# Patient Record
Sex: FEMALE | Race: WHITE | NOT HISPANIC OR LATINO | URBAN - METROPOLITAN AREA
[De-identification: names, ages, dates, MRNs, and addresses within clinical notes are randomized per-mention and may not be internally consistent; named-entity substitution may affect disease eponyms.]

---

## 2017-01-23 ENCOUNTER — OFFICE VISIT - RIVER FALLS (OUTPATIENT)
Dept: FAMILY MEDICINE | Facility: CLINIC | Age: 27
End: 2017-01-23

## 2017-01-23 ASSESSMENT — MIFFLIN-ST. JEOR: SCORE: 1335.6

## 2017-05-27 ENCOUNTER — COMMUNICATION - RIVER FALLS (OUTPATIENT)
Dept: FAMILY MEDICINE | Facility: CLINIC | Age: 27
End: 2017-05-27

## 2017-05-27 ENCOUNTER — OFFICE VISIT - RIVER FALLS (OUTPATIENT)
Dept: FAMILY MEDICINE | Facility: CLINIC | Age: 27
End: 2017-05-27

## 2020-01-08 ENCOUNTER — AMBULATORY - RIVER FALLS (OUTPATIENT)
Dept: FAMILY MEDICINE | Facility: CLINIC | Age: 30
End: 2020-01-08

## 2021-06-12 ENCOUNTER — OFFICE VISIT - RIVER FALLS (OUTPATIENT)
Dept: FAMILY MEDICINE | Facility: CLINIC | Age: 31
End: 2021-06-12

## 2021-06-12 ASSESSMENT — MIFFLIN-ST. JEOR: SCORE: 1299.76

## 2021-11-18 ENCOUNTER — OFFICE VISIT - RIVER FALLS (OUTPATIENT)
Dept: FAMILY MEDICINE | Facility: CLINIC | Age: 31
End: 2021-11-18

## 2021-11-18 ASSESSMENT — MIFFLIN-ST. JEOR: SCORE: 1323.8

## 2022-02-11 VITALS
HEART RATE: 80 BPM | BODY MASS INDEX: 20.66 KG/M2 | SYSTOLIC BLOOD PRESSURE: 104 MMHG | TEMPERATURE: 98.2 F | DIASTOLIC BLOOD PRESSURE: 66 MMHG | WEIGHT: 123.2 LBS

## 2022-02-11 VITALS
WEIGHT: 138.6 LBS | HEART RATE: 76 BPM | HEIGHT: 65 IN | DIASTOLIC BLOOD PRESSURE: 58 MMHG | SYSTOLIC BLOOD PRESSURE: 100 MMHG | BODY MASS INDEX: 23.09 KG/M2 | TEMPERATURE: 98.7 F

## 2022-02-11 VITALS
SYSTOLIC BLOOD PRESSURE: 110 MMHG | BODY MASS INDEX: 22.66 KG/M2 | WEIGHT: 136 LBS | DIASTOLIC BLOOD PRESSURE: 64 MMHG | HEIGHT: 65 IN | HEART RATE: 72 BPM | TEMPERATURE: 98.5 F

## 2022-02-11 VITALS
WEIGHT: 130.7 LBS | SYSTOLIC BLOOD PRESSURE: 108 MMHG | BODY MASS INDEX: 21.77 KG/M2 | HEART RATE: 66 BPM | TEMPERATURE: 97.2 F | HEIGHT: 65 IN | DIASTOLIC BLOOD PRESSURE: 68 MMHG

## 2022-02-15 NOTE — PROGRESS NOTES
Patient:   NABOR MIN            MRN: [id number]            FIN: [id number]               Age:   26 years     Sex:  Female     :  1990   Associated Diagnoses:   Sorethroat   Author:   Shanti Domínguez      Visit Information      Date of Service: 2017 09:03 am  Performing Location: [facility]  Encounter#: [id number]      Primary Care Provider (PCP):  Aleksandra Stanford MD    NPI# [id number]      Referring Provider:  No referring provider recorded for selected visit.      Chief Complaint   2017 9:05 AM CDT    PT c/o sore throat since last night.        History of Present Illness   Confirmed symptoms and concerns with patient as presented in CC above.  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    Ear/Nose/Mouth/Throat:  No ear pain, No nasal congestion.    Respiratory:  No shortness of breath, No cough, No wheezing.    Gastrointestinal:  No nausea, No vomiting, No diarrhea.             Health Status   Allergies:    Allergic Reactions (Selected)  No known allergies   Medications:  (Selected)   Prescriptions  Prescribed  Prenatal AD oral tablet: 1 tab(s), po, daily, # 30 tab(s), 0 Refill(s), Type: Maintenance, OTC (Rx)   Problem list:    All Problems  Pregnant / SNOMED CT 289668718 / Confirmed  Mole NOS / ICD-9- / Confirmed  Little mole beneath navel.  Acne / ICD-9-.1 / Confirmed  ASCUS with positive high risk HPV / ICD-9-.15 / Confirmed  Inactive: Chickenpox / ICD-9-.9  Resolved: ASCUS Pap  Resolved: Tonsillitis with exudate / ICD-9-  Resolved: Otitis media with effusion - serous / SNOMED CT 966120906  Left ear.  Canceled: Chicken Pox  Canceled: Otitis media with effusion / SNOMED CT 484365603  Left ear.      Histories   Past Medical History:    Active  Mole NOS (631)  Comments:  3/21/2013 CDT 9:38 AM  CDT - Mandie Gates  Little mole beneath navel.  Resolved  Tonsillitis with exudate (463): Onset in the month of 1/2009 at 18 years  Resolved.  Otitis media with effusion - serous (913365787): Onset in the month of 4/2008 at 17 years  Resolved.  Comments:  3/21/2013 CDT 9:43 AM CDT - Mandie Gates  Left ear.  ASCUS Pap:  Resolved.   Family History:    Hypertension  Grandmother (P)  Hypercholesterolemia  Grandmother (P)  Diabetes mellitus - adult onset  Grandfather (M)  Melanoma  Father (DENNY KOLB)     Procedure history:    No active procedure history items have been selected or recorded.   Social History:        Alcohol Assessment            Current, 1-2 times per week, 4 drinks/episode average.      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  Spouse/Partner name: Travis Goode.  0 children.      Exercise and Physical Activity Assessment            Exercise frequency: 2-4 times per week.  Exercise type: Weight lifting.      Sexual Assessment            Sexually active: Yes.  Sexual orientation: Heterosexual.  Other contraceptive use: Birth Control Pill.        Physical Examination   Vital Signs   5/27/2017 9:05 AM CDT Temperature Tympanic 98.2 DegF    Peripheral Pulse Rate 80 bpm    Pulse Site Radial artery    HR Method Manual    Systolic Blood Pressure 104 mmHg    Diastolic Blood Pressure 66 mmHg    Mean Arterial Pressure 79 mmHg    BP Site Right arm    BP Method Manual      Measurements from flowsheet : Measurements   5/27/2017 9:05 AM CDT    Weight Measured - Standard                123.2 lb     General:  Alert and oriented, No acute distress.    Eye:  Normal conjunctiva.    HENT:  Tympanic  membranes are clear, Normal hearing, Oral mucosa is moist, No pharyngeal erythema, No sinus tenderness.    Neck:  Supple, Non-tender, No lymphadenopathy.    Respiratory:  Lungs are clear to auscultation, Respirations are non-labored, Breath sounds are equal, Symmetrical chest wall expansion.    Cardiovascular:  Normal rate, Regular rhythm, No murmur.    Musculoskeletal:  Normal range of motion, Normal gait.    Integumentary:  Warm, Dry, Pink, No rash.    Neurologic:  Alert, Oriented.    Psychiatric:  Cooperative.       Review / Management   Results review:  rapid strep neg.       Impression and Plan   Diagnosis     Sorethroat (QAT13-EK J02.9).     Patient Instructions:       Counseled: Patient, Regarding diagnosis, Regarding treatment, Regarding medications, Verbalized understanding, Counseled on symptomatic management. Return to clinic for re evaluation if worsening, simply not improving, or failure to resolve.   , likely viral.

## 2022-02-15 NOTE — PROGRESS NOTES
Patient:   NABOR MIN            MRN: [id number]            FIN: [id number]               Age:   26 years     Sex:  Female     :  1990   Associated Diagnoses:   Pain of left breast   Author:   Shanti Domínguez      Chief Complaint   2017 6:41 PM CST    c/o poss mastitis in left breast        History of Present Illness   Symptoms and concerns as expressed in CC reviewed and confirmed with patient. Worried about mastitis. Is nursing 4 month old, baby is growing well. This is first baby. Left breast was sore yesterday and she had fever yesterday also. Took fever reducer, fever has resolved now and breast not as tender but feels it was a little red and is better. Took hot shower and nursed baby to help relieve discomfort.    No URI symptoms      Health Status   Allergies:    Allergic Reactions (Selected)  No known allergies   Medications:  (Selected)   Prescriptions  Prescribed  Prenatal AD oral tablet: 1 tab(s), po, daily, # 30 tab(s), 0 Refill(s), Type: Maintenance, OTC (Rx)  dicloxacillin 250 mg oral capsule: 1 cap(s) ( 250 mg ), PO, qid, x 10 day(s), Instructions: she will call only if she needs this--do not fill yet, # 40 cap(s), 0 Refill(s), Type: Acute, Pharmacy: [facility] #[id number], 1 cap(s) po qid,x10 day(s),Instr:she will call only if she needs th...   Problem list:    All Problems  Pregnant / SNOMED CT 866768317 / Confirmed  Mole NOS / ICD-9- / Confirmed  Little mole beneath navel.  Acne / ICD-9-.1 / Confirmed  ASCUS with positive high risk HPV / ICD-9-.15 / Confirmed  Inactive: Chickenpox / ICD-9-.9  Resolved: ASCUS Pap  Resolved: Tonsillitis with exudate / ICD-9-  Resolved: Otitis media with effusion - serous / SNOMED CT 731218444  Left ear.  Canceled: Chicken Pox  Canceled: Otitis media with effusion / SNOMED CT 149734201  Left ear.      Histories   Past Medical History:    Active  Mole NOS (631)  Comments:  3/21/2013 CDT 9:38 AM CDT - Mandie Gates  beneath navel.  Resolved  Tonsillitis with exudate (463): Onset in the month of 1/2009 at 18 years  Resolved.  Otitis media with effusion - serous ([id number]): Onset in the month of 4/2008 at 17 years  Resolved.  Comments:  3/21/2013 CDT 9:43 AM CDT - Mandie Gates  Left ear.  ASCUS Pap:  Resolved.   Family History:    Hypertension  Grandmother (P)  Hypercholesterolemia  Grandmother (P)  Diabetes mellitus - adult onset  Grandfather (M)  Melanoma  Father (DENNY KOLB)     Procedure history:    No active procedure history items have been selected or recorded.   Social History:        Alcohol Assessment            Current, 1-2 times per week, 4 drinks/episode average.      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  Spouse/Partner name: Travis Goode.  0 children.      Exercise and Physical Activity Assessment            Exercise frequency: 2-4 times per week.  Exercise type: Weight lifting.      Sexual Assessment            Sexually active: Yes.  Sexual orientation: Heterosexual.  Other contraceptive use: Birth Control Pill.        Physical Examination   Vital Signs   1/23/2017 6:41 PM CST Temperature Tympanic 98.7 DegF    Peripheral Pulse Rate 76 bpm    Pulse Site Radial artery    HR Method Manual    Systolic Blood Pressure 100 mmHg    Diastolic Blood Pressure 58 mmHg  LOW    Mean Arterial Pressure 72 mmHg    BP Site Right arm    BP Method Manual      Measurements from flowsheet : Measurements   1/23/2017 6:41 PM CST Height Measured - Standard 64.75 in    Weight Measured - Standard 138.6 lb    BSA 1.69 m2    Body Mass Index 23.24 kg/m2      General:  Alert and oriented, No acute distress, left breast  with a little localized pink area 9-11 oclock, minimal tenderness, no firmness palpable.       Impression and Plan   Diagnosis     Pain of left breast (VIK39-RT N64.4).     Plan:  It seems this is improving, the warm pack/shower and nursing may have resolved the problem. Educated that if redness returns and fever returns, treat as mastitis, she expresses understanding.    Patient Instructions:       Counseled: Patient, Regarding diagnosis, Regarding treatment, Regarding medications, Verbalized understanding.    Orders     Orders (Selected)   Prescriptions  Prescribed  dicloxacillin 250 mg oral capsule: 1 cap(s) ( 250 mg ), PO, qid, x 10 day(s), Instructions: she will call only if she needs this--do not fill yet, # 40 cap(s), 0 Refill(s), Type: Acute, Pharmacy: [facility] #[id number], 1 cap(s) po qid,x10 day(s),Instr:she will call only if she needs th....

## 2022-02-15 NOTE — PROGRESS NOTES
Chief Complaint    c/o itchy eyes, woke up this morning with eyes matted closed  History of Present Illness      Chief complaint as above reviewed and confirmed with patient.  Pt presents to the clinic with concerns re: eye redness, irritation, discharge and mattering. No uri sx.  Wears contacts. no eye pain or photohobia. no vision change.  no known seasonal allergies.       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          Review of Systems      Review of systems is negative with the exception of those noted in HPI          Physical Exam   Vitals & Measurements    T: 97.2  F (Tympanic)  HR: 66 (Peripheral)  BP: 108/68     HT: 64.75 in  WT: 130.7 lb  BMI: 21.92           Vitals as above per nursing documentation           Constitutional : nad appears well      conjunctiva mildy injected with clear tearing, some mattering on the lid edges. PERRL, EOMI          Ears: ears patent B, TMS intact, noninjected           Nose: nasal mucosa is non-edematous. no discharge           Throat: pharynx is nonerythematous, no tonsillar hypertrophy, no exudate           Neck: neck supple, no adenopathy, no thyromegaly, no rigidity                      Assessment/Plan       1. Conjunctivitis (H10.9)         polytrim as ordered.  Fu for persistent or worsening sx.         Ordered:          [id number] office o/p est low 20-29 min (Charge), Quantity: 1, Conjunctivitis                Orders:         polymyxin B-trimethoprim ophthalmic, 1 drop(s), Eye-Both, q3 hr (int), x 7 day(s), # 10 mL, 0 Refill(s), Type: Acute, Pharmacy: [facility] #[id number], 1 drop(s) Eye-Both q3 hr (int),x7 day(s), 64.75, in, 06/12/21 8:38:00 CDT, Height Measured, 130.7, lb, 06/12/21 8:38:00 CDT, Weight..., (Ordered)  Patient Information     Name:NABOR MIN      Address:      [address] [place] [id number]     Sex:Female     YOB: 1990     [phone]     MRN:[id number]     FIN:[id number]      Location:[facility]     Date of Service:06/12/2021      Primary Care Physician:       NONE ,       Attending Physician:       Jak CASTRO, Nataliia RAY, [phone]  Problem List/Past Medical History    Ongoing     Acne     ASCUS with positive high risk HPV cervical     Benign mole       Comments: Little mole beneath navel.     History of chickenpox    Historical     Otitis media with effusion - serous       Comments: Left ear.     Pregnancy     Pregnancy     Tonsillitis with exudate  Medications    Polytrim 10,000 units-1 mg/mL ophthalmic solution, 1 drop(s), Eye-Both, q3 hr (int)  Allergies    No known allergies  Social History    Smoking Status     Never smoker     Alcohol      Current, 1-2 times per week, 4 drinks/episode average.     Electronic Cigarette/Vaping      Electronic Cigarette Use: Never.     Employment/School      Employed, Work/School description: Teacher.     Exercise      Exercise frequency: 2-4 times per week. Exercise type: Weight lifting.     Home/Environment      Marital status: Single. Spouse/Partner name: Travis Goode. 0 children.     Sexual      Sexually active: Yes. Sexual orientation: Heterosexual. Other contraceptive use: Birth Control Pill.     Substance Abuse      Never     Tobacco      Never (less than 100 in lifetime)  Family History    Diabetes mellitus - adult onset: Grandfather (M).    Hypercholesterolemia: Grandmother (P).    Hypertension: Grandmother (P).    Melanoma: Father.    Mother: History is negative    Brother: History is negative    Brother: History is negative  Immunizations       Scheduled Immunizations       Dose Date(s)       Other Immunizations               Hep B       09/13/2001, 11/08/2001, 03/07/2002       MMR (measles/mumps/rubella)       05/07/1991, 08/07/1995       meningococcal polysaccharide vaccine       07/25/2006       DTP (obsolete)       1990, 01/22/1990, 03/26/1990, 07/31/1991, 08/07/1995       OPV       1989, 01/22/1990,  07/31/1991, 08/07/1995       influenza virus vaccine, inactivated       01/08/2020       Td       08/08/1995, 08/10/2004       meningococcal conjugate vaccine       07/25/2006       tetanus/diphth/pertuss (Tdap) adult/adol       08/05/2014

## 2022-02-15 NOTE — NURSING NOTE
Comprehensive Intake Entered On:  11/18/2021 7:41 AM CST    Performed On:  11/18/2021 7:37 AM CST by Mariam Meade LPN               Summary   Chief Complaint :   right ear pain started last night.    Menstrual Status :   Menarcheal   Weight Measured :   136 lb(Converted to: 136 lb 0 oz, 61.689 kg)    Height Measured :   64.75 in(Converted to: 5 ft 5 in, 164.46 cm)    Body Mass Index :   22.8 kg/m2   Body Surface Area :   1.68 m2   Systolic Blood Pressure :   110 mmHg   Diastolic Blood Pressure :   64 mmHg   Mean Arterial Pressure :   79 mmHg   Peripheral Pulse Rate :   72 bpm   BP Site :   Right arm   Pulse Site :   Radial artery   BP Method :   Manual   HR Method :   Manual   Temperature Tympanic :   98.5 DegF(Converted to: 36.9 DegC)    Race :      Languages :   English   Ethnicity :   Not  or    Mariam Meade LPN - 11/18/2021 7:37 AM CST   Health Status   Allergies Verified? :   Yes   Medication History Verified? :   Yes   Pre-Visit Planning Status :   Completed   Tobacco Use? :   Never smoker   Mariam Meade LPN - 11/18/2021 7:37 AM CST   Consents   Consent for Immunization Exchange :   Consent Granted   Consent for Immunizations to Providers :   Consent Granted   Mariam Meade LPN - 11/18/2021 7:37 AM CST   Meds / Allergies   (As Of: 11/18/2021 7:41:51 AM CST)   Allergies (Active)   No known allergies  Estimated Onset Date:   Unspecified ; Created By:   Karen Lopez; Reaction Status:   Active ; Category:   Drug ; Substance:   No known allergies ; Type:   Allergy ; Updated By:   Karen Lopez; Reviewed Date:   11/18/2021 7:40 AM CST        Medication List   (As Of: 11/18/2021 7:41:51 AM CST)

## 2022-02-15 NOTE — NURSING NOTE
Comprehensive Intake Entered On:  6/12/2021 8:43 AM CDT    Performed On:  6/12/2021 8:38 AM CDT by Emmie Bernstein CMA               Summary   Chief Complaint :   c/o itchy eyes, woke up this morning with eyes matted closed     Menstrual Status :   Menarcheal   Weight Measured :   130.7 lb(Converted to: 130 lb 11 oz, 59.285 kg)    Height Measured :   64.75 in(Converted to: 5 ft 5 in, 164.46 cm)    Body Mass Index :   21.92 kg/m2   Body Surface Area :   1.64 m2   Systolic Blood Pressure :   108 mmHg   Diastolic Blood Pressure :   68 mmHg   Mean Arterial Pressure :   81 mmHg   Peripheral Pulse Rate :   66 bpm   BP Site :   Right arm   Pulse Site :   Radial artery   BP Method :   Manual   HR Method :   Manual   Temperature Tympanic :   97.2 DegF(Converted to: 36.2 DegC)  (LOW)    Race :      Languages :   English   Ethnicity :   Not  or    Emmie Bernstein CMA - 6/12/2021 8:38 AM CDT   Health Status   Allergies Verified? :   Yes   Medication History Verified? :   Yes   Medical History Verified? :   No   Pre-Visit Planning Status :   Not completed   Tobacco Use? :   Never smoker   Emmie Bernstein CMA - 6/12/2021 8:38 AM CDT   Consents   Consent for Immunization Exchange :   Consent Granted   Consent for Immunizations to Providers :   Consent Granted   Emmie Bernstein CMA - 6/12/2021 8:38 AM CDT   Meds / Allergies   (As Of: 6/12/2021 8:43:09 AM CDT)   Allergies (Active)   No known allergies  Estimated Onset Date:   Unspecified ; Created By:   Karen Lopez; Reaction Status:   Active ; Category:   Drug ; Substance:   No known allergies ; Type:   Allergy ; Updated By:   Karen Lopez; Reviewed Date:   6/12/2021 8:41 AM CDT        Medication List   (As Of: 6/12/2021 8:43:09 AM CDT)   Prescription/Discharge Order    multivitamin, prenatal  :   multivitamin, prenatal ; Status:   Processing ; Ordered As Mnemonic:   Prenatal AD oral tablet ; Ordering Provider:   Aleksandra Stanford MD; Action Display:    Complete ; Catalog Code:   multivitamin, prenatal ; Order Dt/Tm:   6/12/2021 8:41:26 AM CDT            ID Risk Screen   Recent Travel History :   No recent travel   Family Member Travel History :   No recent travel   Other Exposure to Infectious Disease :   Unknown   COVID-19 Testing Status :   No COVID-19 test performed   Emmie Bernstein CMA - 6/12/2021 8:38 AM CDT   Social History   Social History   (As Of: 6/12/2021 8:43:09 AM CDT)   Alcohol:        Current, 1-2 times per week, 4 drinks/episode average.   (Last Updated: 8/2/2013 3:20:35 PM CDT by Margoth Moncada)          Tobacco:        Never (less than 100 in lifetime)   (Last Updated: 6/12/2021 8:39:05 AM CDT by Emmie Bernstein CMA)          Electronic Cigarette/Vaping:        Electronic Cigarette Use: Never.   (Last Updated: 6/12/2021 8:39:08 AM CDT by Emmie Bernstein CMA)          Substance Abuse:        Never   (Last Updated: 8/1/2012 7:43:11 AM CDT by Carmelita Goddard MA)          Employment/School:        Employed, Work/School description: Teacher.   Comments:  8/2/2013 3:16 PM - Margoth Moncada:    (Last Updated: 8/5/2014 3:25:41 PM CDT by Yue Hansen MA)          Home/Environment:        Marital status: Single.  Spouse/Partner name: Travis Goode.  0 children.   (Last Updated: 8/5/2014 3:26:04 PM CDT by Yue Hansen MA)          Exercise:        Exercise frequency: 2-4 times per week.  Exercise type: Weight lifting.   (Last Updated: 8/18/2015 11:17:32 AM CDT by Viola Del Rosario CMA)          Sexual:        Sexually active: Yes.  Sexual orientation: Heterosexual.  Other contraceptive use: Birth Control Pill.   (Last Updated: 8/1/2012 7:44:26 AM CDT by Carmelita Goddard MA)

## 2022-02-15 NOTE — PROGRESS NOTES
Patient:   NABOR MIN            MRN: [id number]            FIN: [id number]               Age:   31 years     Sex:  Female     :  1990   Associated Diagnoses:   Acute exudative otitis media of right ear   Author:   Fede CASTRO, Neil      Report Summary   Diagnosis  Acute exudative otitis media of right ear ([id number] H66.001).  Patient InstructionsSummaryOrders   Visit Information      Date of Service: 2021 07:36 am  Performing Location: [facility]  Encounter#: [id number]   Visit type:  General concerns.    Accompanied by:  No one.    Source of history:  Self, Medical record.    Referral source:  Self.    History limitation:  None.       Chief Complaint   2021 7:37 AM CST   right ear pain started last night.        History of Present Illness             The patient presents with earache.  The location of the earache is the right ear.  The earache is characterized by pain and sensation of fullness.  The severity of the earache is moderate.  The earache is constant.  The earache has lasted for 1 day(s).  Mild nasal congestion. No fever or chills. Felt/heard gurgling last night. Sharp pain. No drainage from canal. CC above noted and confirmed with the patient..        Review of Systems   Constitutional:  Negative.    Ear/Nose/Mouth/Throat:  Negative except as documented in history of present illness.    Respiratory:  Negative.       Health Status   Allergies:    Allergic Reactions (Selected)  No known allergies   Medications:  (Selected)   Prescriptions  Prescribed  amoxicillin 875 mg oral tablet: = 1 tab(s) ( 875 mg ), PO, BID, # 20 tab(s), 0 Refill(s), Type: Maintenance, Pharmacy: [facility] #[id number], 1 tab(s) Oral bid,x10 day(s), 64.75, in, 21 7:37:00 CST, Height Measured, 136, lb, 21 7:37:00 CST, Weight Measured   Problem list:    All Problems (Selected)  Pregnant / SNOMED CT 501288877 / Confirmed  Benign mole / SNOMED CT 9177066274 / Confirmed  ASCUS with  positive high risk HPV cervical / SNOMED CT 8683650278 / Confirmed  Acne / SNOMED CT 56483341 / Confirmed      Histories   Past Medical History:    Active  ASCUS with positive high risk HPV cervical (1201908025): Onset on 3/25/2013 at 22 years.  Acne (28308515)  Benign mole (7327904540)  Comments:  3/21/2013 CDT 9:38 AM CDT - Mandie Gates  Little mole beneath navel.  Resolved  Pregnancy (977228388): Onset on 7/12/2017 at 26 years.  Resolved on 4/18/2018 at 27 years.  Pregnancy (621893796): Onset on 12/10/2015 at 25 years.  Resolved on 9/15/2016 at 25 years.  Tonsillitis with exudate (037636616): Onset in the month of 1/2009 at 18 years  Resolved.  Otitis media with effusion - serous (554763987): Onset in the month of 4/2008 at 17 years  Resolved.  Comments:  3/21/2013 CDT 9:43 AM CDT Teri Mandie Gates  Left ear.   Family History:    Hypertension  Grandmother (P)  Hypercholesterolemia  Grandmother (P)  Diabetes mellitus - adult onset  Grandfather (M)  Melanoma  Father (DENNY KOLB)     Procedure history:    No active procedure history items have been selected or recorded.   Social History:        Electronic Cigarette/Vaping Assessment            Electronic Cigarette Use: Never.      Alcohol Assessment            Current, 1-2 times per week, 4 drinks/episode average.      Tobacco Assessment            Never (less than 100 in lifetime)      Substance Abuse Assessment            Never      Employment and Education Assessment            Employed, Work/School description: Teacher.                     Comments:                      08/02/2013 - [employer]      Home and Environment Assessment            Marital status: Single.  Spouse/Partner name: Travis Goode.  0 children.      Exercise and Physical Activity Assessment            Exercise frequency: 2-4 times per week.  Exercise type: Weight lifting.      Sexual Assessment            Sexually active: Yes.  Sexual orientation:  Heterosexual.  Other contraceptive use: Birth Control Pill.        Physical Examination   Vital Signs   11/18/2021 7:37 AM CST Temperature Tympanic 98.5 DegF    Peripheral Pulse Rate 72 bpm    Pulse Site Radial artery    HR Method Manual    Systolic Blood Pressure 110 mmHg    Diastolic Blood Pressure 64 mmHg    Mean Arterial Pressure 79 mmHg    BP Site Right arm    BP Method Manual      Measurements from flowsheet : Measurements   11/18/2021 7:37 AM CST Height Measured - Standard 64.75 in    Weight Measured - Standard 136 lb    BSA 1.68 m2    Body Mass Index 22.8 kg/m2      General:  Alert and oriented, No acute distress.    Eye:  Pupils are equal, round and reactive to light, Extraocular movements are intact, Normal conjunctiva.    HENT:  Normocephalic, Oral mucosa is moist, No pharyngeal erythema.         Ear: Right ear, Tympanic membrane ( Intact, Erythematous, Fluid in middle ear ).    Neck:  Non-tender, No lymphadenopathy.    Respiratory:  Lungs are clear to auscultation, Respirations are non-labored, Breath sounds are equal.    Cardiovascular:  Normal rate, Regular rhythm.       Impression and Plan   Diagnosis     Acute exudative otitis media of right ear ([id number] H66.001).     Patient Instructions:       Counseled: Patient, Regarding diagnosis, Regarding treatment, Regarding medications, Activity, Verbalized understanding.    Summary:  Take medicine as prescribed, side effects discussed.  Tylenol/ibuprofen for fever and discomfort.  Push fluids.  RTC if not improving in 36-48 hours, prior if concerns as we have discussed.  .    Orders     Orders (Selected)   Prescriptions  Prescribed  amoxicillin 875 mg oral tablet: = 1 tab(s) ( 875 mg ), PO, BID, # 20 tab(s), 0 Refill(s), Type: Maintenance, Pharmacy: [facility] #[id number], 1 tab(s) Oral bid,x10 day(s), 64.75, in, 11/18/21 7:37:00 CST, Height Measured, 136, lb, 11/18/21 7:37:00 CST, Weight Measured.

## 2023-05-25 ENCOUNTER — TELEPHONE (OUTPATIENT)
Dept: FAMILY MEDICINE | Facility: CLINIC | Age: 33
End: 2023-05-25

## 2023-05-25 NOTE — TELEPHONE ENCOUNTER
Patient was notified that she will need to be seen in an UC setting as  does not have any available appts. Patient last seen 11/2021. No PCP

## 2023-05-25 NOTE — TELEPHONE ENCOUNTER
Reason for Call:  Appointment Request    Patient requesting this type of appt:  Office visit    Requested provider: Any provider    Reason patient unable to be scheduled: Not within requested timeframe    When does patient want to be seen/preferred time: Same day    Comments: Patient called and thinks she may have strep and is wanting to be seen in clinic today requesting work in     Okay to leave a detailed message?: Yes at Home number on file [phone] (home)    Call taken on 5/25/2023 at 12:15 PM by Kristan Lopez